# Patient Record
Sex: FEMALE | Race: WHITE | Employment: UNEMPLOYED | URBAN - METROPOLITAN AREA
[De-identification: names, ages, dates, MRNs, and addresses within clinical notes are randomized per-mention and may not be internally consistent; named-entity substitution may affect disease eponyms.]

---

## 2018-05-21 ENCOUNTER — OFFICE VISIT (OUTPATIENT)
Dept: FAMILY MEDICINE CLINIC | Age: 78
End: 2018-05-21
Payer: MEDICARE

## 2018-05-21 VITALS
OXYGEN SATURATION: 98 % | BODY MASS INDEX: 25.6 KG/M2 | HEART RATE: 128 BPM | HEIGHT: 63 IN | SYSTOLIC BLOOD PRESSURE: 138 MMHG | WEIGHT: 144.5 LBS | TEMPERATURE: 97.2 F | DIASTOLIC BLOOD PRESSURE: 98 MMHG

## 2018-05-21 DIAGNOSIS — I61.3 RIGHT-SIDED NONTRAUMATIC INTRACEREBRAL HEMORRHAGE OF BRAINSTEM (HCC): Primary | Chronic | ICD-10-CM

## 2018-05-21 DIAGNOSIS — R00.0 TACHYCARDIA: Chronic | ICD-10-CM

## 2018-05-21 DIAGNOSIS — I10 ESSENTIAL HYPERTENSION: Chronic | ICD-10-CM

## 2018-05-21 DIAGNOSIS — E78.00 PURE HYPERCHOLESTEROLEMIA: Chronic | ICD-10-CM

## 2018-05-21 DIAGNOSIS — G81.94 HEMIPARESIS OF LEFT NONDOMINANT SIDE DUE TO NONTRAUMATIC INTRACEREBRAL HEMORRHAGE (HCC): Chronic | ICD-10-CM

## 2018-05-21 DIAGNOSIS — I61.9 HEMIPARESIS OF LEFT NONDOMINANT SIDE DUE TO NONTRAUMATIC INTRACEREBRAL HEMORRHAGE (HCC): Chronic | ICD-10-CM

## 2018-05-21 DIAGNOSIS — D50.9 IRON DEFICIENCY ANEMIA, UNSPECIFIED IRON DEFICIENCY ANEMIA TYPE: Chronic | ICD-10-CM

## 2018-05-21 PROCEDURE — 4040F PNEUMOC VAC/ADMIN/RCVD: CPT | Performed by: FAMILY MEDICINE

## 2018-05-21 PROCEDURE — G8400 PT W/DXA NO RESULTS DOC: HCPCS | Performed by: FAMILY MEDICINE

## 2018-05-21 PROCEDURE — 93000 ELECTROCARDIOGRAM COMPLETE: CPT | Performed by: FAMILY MEDICINE

## 2018-05-21 PROCEDURE — 1090F PRES/ABSN URINE INCON ASSESS: CPT | Performed by: FAMILY MEDICINE

## 2018-05-21 PROCEDURE — 1036F TOBACCO NON-USER: CPT | Performed by: FAMILY MEDICINE

## 2018-05-21 PROCEDURE — 99205 OFFICE O/P NEW HI 60 MIN: CPT | Performed by: FAMILY MEDICINE

## 2018-05-21 PROCEDURE — G8419 CALC BMI OUT NRM PARAM NOF/U: HCPCS | Performed by: FAMILY MEDICINE

## 2018-05-21 PROCEDURE — G8427 DOCREV CUR MEDS BY ELIG CLIN: HCPCS | Performed by: FAMILY MEDICINE

## 2018-05-21 PROCEDURE — 1123F ACP DISCUSS/DSCN MKR DOCD: CPT | Performed by: FAMILY MEDICINE

## 2018-05-21 RX ORDER — LISINOPRIL 20 MG/1
20 TABLET ORAL DAILY
COMMUNITY
Start: 2018-05-03 | End: 2018-05-23 | Stop reason: SDUPTHER

## 2018-05-21 RX ORDER — ATENOLOL 25 MG/1
TABLET ORAL
COMMUNITY
Start: 2018-05-02 | End: 2018-05-21 | Stop reason: ALTCHOICE

## 2018-05-21 RX ORDER — WHEELCHAIR
EACH MISCELLANEOUS
Qty: 1 EACH | Refills: 0 | Status: SHIPPED | OUTPATIENT
Start: 2018-05-21

## 2018-05-21 RX ORDER — SIMVASTATIN 20 MG
TABLET ORAL
COMMUNITY
Start: 2018-05-02 | End: 2018-05-21 | Stop reason: CLARIF

## 2018-05-21 RX ORDER — ROSUVASTATIN CALCIUM 20 MG/1
20 TABLET, COATED ORAL NIGHTLY
Qty: 30 TABLET | Refills: 5 | Status: SHIPPED | OUTPATIENT
Start: 2018-05-21 | End: 2018-11-15 | Stop reason: SDUPTHER

## 2018-05-21 RX ORDER — IBUPROFEN 200 MG
CAPSULE ORAL
COMMUNITY
Start: 2001-09-11

## 2018-05-21 RX ORDER — METOPROLOL SUCCINATE 50 MG/1
50 TABLET, EXTENDED RELEASE ORAL DAILY
Qty: 30 TABLET | Refills: 5 | Status: SHIPPED | OUTPATIENT
Start: 2018-05-21 | End: 2018-11-09 | Stop reason: DRUGHIGH

## 2018-05-21 RX ORDER — HYDROCHLOROTHIAZIDE 12.5 MG/1
12.5 CAPSULE, GELATIN COATED ORAL DAILY
Qty: 30 CAPSULE | Refills: 5 | Status: SHIPPED | OUTPATIENT
Start: 2018-05-21 | End: 2018-05-21 | Stop reason: ALTCHOICE

## 2018-05-21 RX ORDER — UBIDECARENONE 100 MG
100 CAPSULE ORAL DAILY
Qty: 30 CAPSULE | Refills: 5 | Status: SHIPPED | OUTPATIENT
Start: 2018-05-21 | End: 2018-06-27

## 2018-05-21 RX ORDER — BACLOFEN 10 MG/1
10 TABLET ORAL 3 TIMES DAILY PRN
COMMUNITY
Start: 2018-05-03 | End: 2018-06-06 | Stop reason: ALTCHOICE

## 2018-05-21 ASSESSMENT — ENCOUNTER SYMPTOMS
CHEST TIGHTNESS: 0
CONSTIPATION: 0
BLOOD IN STOOL: 0
TROUBLE SWALLOWING: 1
COUGH: 0
BACK PAIN: 0
SHORTNESS OF BREATH: 0
SORE THROAT: 0
NAUSEA: 0
VOMITING: 0
DIARRHEA: 0
ABDOMINAL PAIN: 0

## 2018-05-21 ASSESSMENT — PATIENT HEALTH QUESTIONNAIRE - PHQ9
SUM OF ALL RESPONSES TO PHQ9 QUESTIONS 1 & 2: 0
SUM OF ALL RESPONSES TO PHQ QUESTIONS 1-9: 0
1. LITTLE INTEREST OR PLEASURE IN DOING THINGS: 0
2. FEELING DOWN, DEPRESSED OR HOPELESS: 0

## 2018-05-22 ENCOUNTER — TELEPHONE (OUTPATIENT)
Dept: FAMILY MEDICINE CLINIC | Age: 78
End: 2018-05-22

## 2018-05-23 ENCOUNTER — CARE COORDINATION (OUTPATIENT)
Dept: CARE COORDINATION | Age: 78
End: 2018-05-23

## 2018-05-23 DIAGNOSIS — D50.9 IRON DEFICIENCY ANEMIA, UNSPECIFIED IRON DEFICIENCY ANEMIA TYPE: Chronic | ICD-10-CM

## 2018-05-23 DIAGNOSIS — I10 ESSENTIAL HYPERTENSION: Chronic | ICD-10-CM

## 2018-05-23 LAB
ALBUMIN SERPL-MCNC: 3.9 G/DL (ref 3.9–4.9)
ALP BLD-CCNC: 47 U/L (ref 40–130)
ALT SERPL-CCNC: 10 U/L (ref 0–33)
ANION GAP SERPL CALCULATED.3IONS-SCNC: 17 MEQ/L (ref 7–13)
AST SERPL-CCNC: 14 U/L (ref 0–35)
BASOPHILS ABSOLUTE: 0 K/UL (ref 0–0.2)
BASOPHILS RELATIVE PERCENT: 0.6 %
BILIRUB SERPL-MCNC: 0.4 MG/DL (ref 0–1.2)
BUN BLDV-MCNC: 11 MG/DL (ref 8–23)
CALCIUM SERPL-MCNC: 9.9 MG/DL (ref 8.6–10.2)
CHLORIDE BLD-SCNC: 94 MEQ/L (ref 98–107)
CHOLESTEROL, FASTING: 184 MG/DL (ref 0–199)
CO2: 25 MEQ/L (ref 22–29)
CREAT SERPL-MCNC: 0.56 MG/DL (ref 0.5–0.9)
CREATININE URINE: 119.1 MG/DL
EOSINOPHILS ABSOLUTE: 0.2 K/UL (ref 0–0.7)
EOSINOPHILS RELATIVE PERCENT: 1.9 %
FERRITIN: 816.3 NG/ML (ref 13–150)
GFR AFRICAN AMERICAN: >60
GFR NON-AFRICAN AMERICAN: >60
GLOBULIN: 3.4 G/DL (ref 2.3–3.5)
GLUCOSE BLD-MCNC: 90 MG/DL (ref 74–109)
HCT VFR BLD CALC: 36.7 % (ref 37–47)
HDLC SERPL-MCNC: 55 MG/DL (ref 40–59)
HEMOGLOBIN: 12.4 G/DL (ref 12–16)
IRON SATURATION: 15 % (ref 11–46)
IRON: 39 UG/DL (ref 37–145)
LDL CHOLESTEROL CALCULATED: 111 MG/DL (ref 0–129)
LYMPHOCYTES ABSOLUTE: 1.3 K/UL (ref 1–4.8)
LYMPHOCYTES RELATIVE PERCENT: 16.1 %
MCH RBC QN AUTO: 31.1 PG (ref 27–31.3)
MCHC RBC AUTO-ENTMCNC: 33.8 % (ref 33–37)
MCV RBC AUTO: 92 FL (ref 82–100)
MICROALBUMIN UR-MCNC: <1.2 MG/DL
MICROALBUMIN/CREAT UR-RTO: NORMAL MG/G (ref 0–30)
MONOCYTES ABSOLUTE: 0.7 K/UL (ref 0.2–0.8)
MONOCYTES RELATIVE PERCENT: 9.1 %
NEUTROPHILS ABSOLUTE: 5.8 K/UL (ref 1.4–6.5)
NEUTROPHILS RELATIVE PERCENT: 72.3 %
PDW BLD-RTO: 13 % (ref 11.5–14.5)
PLATELET # BLD: 342 K/UL (ref 130–400)
POTASSIUM SERPL-SCNC: 5 MEQ/L (ref 3.5–5.1)
RBC # BLD: 3.99 M/UL (ref 4.2–5.4)
SODIUM BLD-SCNC: 136 MEQ/L (ref 132–144)
TOTAL IRON BINDING CAPACITY: 259 UG/DL (ref 178–450)
TOTAL PROTEIN: 7.3 G/DL (ref 6.4–8.1)
TRIGLYCERIDE, FASTING: 89 MG/DL (ref 0–200)
TSH SERPL DL<=0.05 MIU/L-ACNC: 1.01 UIU/ML (ref 0.27–4.2)
WBC # BLD: 8.1 K/UL (ref 4.8–10.8)

## 2018-05-24 ENCOUNTER — CARE COORDINATION (OUTPATIENT)
Dept: CARE COORDINATION | Age: 78
End: 2018-05-24

## 2018-05-24 RX ORDER — LISINOPRIL 20 MG/1
20 TABLET ORAL DAILY
Qty: 30 TABLET | Refills: 5 | Status: SHIPPED | OUTPATIENT
Start: 2018-05-24 | End: 2018-07-25 | Stop reason: SINTOL

## 2018-06-05 ENCOUNTER — CARE COORDINATION (OUTPATIENT)
Dept: CARE COORDINATION | Age: 78
End: 2018-06-05

## 2018-06-06 ENCOUNTER — OFFICE VISIT (OUTPATIENT)
Dept: FAMILY MEDICINE CLINIC | Age: 78
End: 2018-06-06
Payer: MEDICARE

## 2018-06-06 ENCOUNTER — CARE COORDINATION (OUTPATIENT)
Dept: CARE COORDINATION | Age: 78
End: 2018-06-06

## 2018-06-06 VITALS
HEIGHT: 63 IN | BODY MASS INDEX: 25.25 KG/M2 | OXYGEN SATURATION: 96 % | WEIGHT: 142.5 LBS | RESPIRATION RATE: 14 BRPM | SYSTOLIC BLOOD PRESSURE: 118 MMHG | HEART RATE: 124 BPM | DIASTOLIC BLOOD PRESSURE: 88 MMHG | TEMPERATURE: 96.5 F

## 2018-06-06 DIAGNOSIS — R00.0 TACHYCARDIA: Chronic | ICD-10-CM

## 2018-06-06 DIAGNOSIS — F32.9 REACTIVE DEPRESSION: Chronic | ICD-10-CM

## 2018-06-06 DIAGNOSIS — E78.00 PURE HYPERCHOLESTEROLEMIA: Chronic | ICD-10-CM

## 2018-06-06 DIAGNOSIS — I61.3 RIGHT-SIDED NONTRAUMATIC INTRACEREBRAL HEMORRHAGE OF BRAINSTEM (HCC): Chronic | ICD-10-CM

## 2018-06-06 DIAGNOSIS — I61.9 HEMIPARESIS OF LEFT NONDOMINANT SIDE DUE TO NONTRAUMATIC INTRACEREBRAL HEMORRHAGE (HCC): Chronic | ICD-10-CM

## 2018-06-06 DIAGNOSIS — G81.94 HEMIPARESIS OF LEFT NONDOMINANT SIDE DUE TO NONTRAUMATIC INTRACEREBRAL HEMORRHAGE (HCC): Chronic | ICD-10-CM

## 2018-06-06 DIAGNOSIS — I10 ESSENTIAL HYPERTENSION: Primary | Chronic | ICD-10-CM

## 2018-06-06 PROCEDURE — 1036F TOBACCO NON-USER: CPT | Performed by: FAMILY MEDICINE

## 2018-06-06 PROCEDURE — G8427 DOCREV CUR MEDS BY ELIG CLIN: HCPCS | Performed by: FAMILY MEDICINE

## 2018-06-06 PROCEDURE — G8419 CALC BMI OUT NRM PARAM NOF/U: HCPCS | Performed by: FAMILY MEDICINE

## 2018-06-06 PROCEDURE — 4040F PNEUMOC VAC/ADMIN/RCVD: CPT | Performed by: FAMILY MEDICINE

## 2018-06-06 PROCEDURE — G8400 PT W/DXA NO RESULTS DOC: HCPCS | Performed by: FAMILY MEDICINE

## 2018-06-06 PROCEDURE — 1123F ACP DISCUSS/DSCN MKR DOCD: CPT | Performed by: FAMILY MEDICINE

## 2018-06-06 PROCEDURE — 99214 OFFICE O/P EST MOD 30 MIN: CPT | Performed by: FAMILY MEDICINE

## 2018-06-06 PROCEDURE — 1090F PRES/ABSN URINE INCON ASSESS: CPT | Performed by: FAMILY MEDICINE

## 2018-06-06 RX ORDER — ESCITALOPRAM OXALATE 10 MG/1
10 TABLET ORAL DAILY
Qty: 30 TABLET | Refills: 5 | Status: SHIPPED | OUTPATIENT
Start: 2018-06-06 | End: 2018-11-09 | Stop reason: SDUPTHER

## 2018-06-07 ENCOUNTER — CARE COORDINATION (OUTPATIENT)
Dept: CARE COORDINATION | Age: 78
End: 2018-06-07

## 2018-06-13 ENCOUNTER — CARE COORDINATION (OUTPATIENT)
Dept: CARE COORDINATION | Age: 78
End: 2018-06-13

## 2018-06-27 ENCOUNTER — OFFICE VISIT (OUTPATIENT)
Dept: FAMILY MEDICINE CLINIC | Age: 78
End: 2018-06-27
Payer: MEDICARE

## 2018-06-27 VITALS
SYSTOLIC BLOOD PRESSURE: 110 MMHG | DIASTOLIC BLOOD PRESSURE: 68 MMHG | HEIGHT: 63 IN | OXYGEN SATURATION: 98 % | TEMPERATURE: 97.8 F | WEIGHT: 134 LBS | RESPIRATION RATE: 16 BRPM | BODY MASS INDEX: 23.74 KG/M2 | HEART RATE: 74 BPM

## 2018-06-27 DIAGNOSIS — R00.0 TACHYCARDIA: Chronic | ICD-10-CM

## 2018-06-27 DIAGNOSIS — R05.9 COUGH: ICD-10-CM

## 2018-06-27 DIAGNOSIS — Z86.718 HISTORY OF DVT OF LOWER EXTREMITY: Chronic | ICD-10-CM

## 2018-06-27 DIAGNOSIS — I61.3 RIGHT-SIDED NONTRAUMATIC INTRACEREBRAL HEMORRHAGE OF BRAINSTEM (HCC): Chronic | ICD-10-CM

## 2018-06-27 DIAGNOSIS — F32.9 REACTIVE DEPRESSION: Chronic | ICD-10-CM

## 2018-06-27 DIAGNOSIS — I10 ESSENTIAL HYPERTENSION: Primary | Chronic | ICD-10-CM

## 2018-06-27 DIAGNOSIS — Z95.828 PRESENCE OF IVC FILTER: Chronic | ICD-10-CM

## 2018-06-27 DIAGNOSIS — G81.94 HEMIPARESIS OF LEFT NONDOMINANT SIDE DUE TO NONTRAUMATIC INTRACEREBRAL HEMORRHAGE (HCC): Chronic | ICD-10-CM

## 2018-06-27 DIAGNOSIS — J34.89 RHINORRHEA: Chronic | ICD-10-CM

## 2018-06-27 DIAGNOSIS — I61.9 HEMIPARESIS OF LEFT NONDOMINANT SIDE DUE TO NONTRAUMATIC INTRACEREBRAL HEMORRHAGE (HCC): Chronic | ICD-10-CM

## 2018-06-27 PROCEDURE — 4040F PNEUMOC VAC/ADMIN/RCVD: CPT | Performed by: FAMILY MEDICINE

## 2018-06-27 PROCEDURE — G8420 CALC BMI NORM PARAMETERS: HCPCS | Performed by: FAMILY MEDICINE

## 2018-06-27 PROCEDURE — G8427 DOCREV CUR MEDS BY ELIG CLIN: HCPCS | Performed by: FAMILY MEDICINE

## 2018-06-27 PROCEDURE — 1123F ACP DISCUSS/DSCN MKR DOCD: CPT | Performed by: FAMILY MEDICINE

## 2018-06-27 PROCEDURE — 1090F PRES/ABSN URINE INCON ASSESS: CPT | Performed by: FAMILY MEDICINE

## 2018-06-27 PROCEDURE — 99214 OFFICE O/P EST MOD 30 MIN: CPT | Performed by: FAMILY MEDICINE

## 2018-06-27 PROCEDURE — 1036F TOBACCO NON-USER: CPT | Performed by: FAMILY MEDICINE

## 2018-06-27 PROCEDURE — G8400 PT W/DXA NO RESULTS DOC: HCPCS | Performed by: FAMILY MEDICINE

## 2018-06-27 RX ORDER — FEXOFENADINE HCL 180 MG/1
180 TABLET ORAL DAILY
Qty: 30 TABLET | Refills: 5 | Status: SHIPPED | OUTPATIENT
Start: 2018-06-27 | End: 2018-07-25 | Stop reason: SINTOL

## 2018-07-02 ENCOUNTER — TELEPHONE (OUTPATIENT)
Dept: FAMILY MEDICINE CLINIC | Age: 78
End: 2018-07-02

## 2018-07-20 ENCOUNTER — CARE COORDINATION (OUTPATIENT)
Dept: CARE COORDINATION | Age: 78
End: 2018-07-20

## 2018-07-23 ENCOUNTER — CARE COORDINATION (OUTPATIENT)
Dept: CARE COORDINATION | Age: 78
End: 2018-07-23

## 2018-07-23 NOTE — CARE COORDINATION
0223  Telephone call with patient who indicated that she was still in bed. She feels everything is good in managing at home. Patient stated she is still going to outpatient therapy at this time.   No new needs or concerns at time of phone call

## 2018-07-25 ENCOUNTER — OFFICE VISIT (OUTPATIENT)
Dept: FAMILY MEDICINE CLINIC | Age: 78
End: 2018-07-25
Payer: MEDICARE

## 2018-07-25 VITALS
DIASTOLIC BLOOD PRESSURE: 66 MMHG | WEIGHT: 133.6 LBS | OXYGEN SATURATION: 98 % | SYSTOLIC BLOOD PRESSURE: 108 MMHG | RESPIRATION RATE: 18 BRPM | HEART RATE: 70 BPM | BODY MASS INDEX: 23.67 KG/M2 | TEMPERATURE: 97.9 F | HEIGHT: 63 IN

## 2018-07-25 DIAGNOSIS — G81.94 HEMIPARESIS OF LEFT NONDOMINANT SIDE DUE TO NONTRAUMATIC INTRACEREBRAL HEMORRHAGE (HCC): Chronic | ICD-10-CM

## 2018-07-25 DIAGNOSIS — I61.9 HEMIPARESIS OF LEFT NONDOMINANT SIDE DUE TO NONTRAUMATIC INTRACEREBRAL HEMORRHAGE (HCC): Chronic | ICD-10-CM

## 2018-07-25 DIAGNOSIS — J34.89 RHINORRHEA: Chronic | ICD-10-CM

## 2018-07-25 DIAGNOSIS — I10 ESSENTIAL HYPERTENSION: Primary | Chronic | ICD-10-CM

## 2018-07-25 DIAGNOSIS — F32.9 REACTIVE DEPRESSION: Chronic | ICD-10-CM

## 2018-07-25 PROCEDURE — 99214 OFFICE O/P EST MOD 30 MIN: CPT | Performed by: FAMILY MEDICINE

## 2018-07-25 PROCEDURE — 1123F ACP DISCUSS/DSCN MKR DOCD: CPT | Performed by: FAMILY MEDICINE

## 2018-07-25 PROCEDURE — 1090F PRES/ABSN URINE INCON ASSESS: CPT | Performed by: FAMILY MEDICINE

## 2018-07-25 PROCEDURE — G8427 DOCREV CUR MEDS BY ELIG CLIN: HCPCS | Performed by: FAMILY MEDICINE

## 2018-07-25 PROCEDURE — 1036F TOBACCO NON-USER: CPT | Performed by: FAMILY MEDICINE

## 2018-07-25 PROCEDURE — G8420 CALC BMI NORM PARAMETERS: HCPCS | Performed by: FAMILY MEDICINE

## 2018-07-25 PROCEDURE — G8400 PT W/DXA NO RESULTS DOC: HCPCS | Performed by: FAMILY MEDICINE

## 2018-07-25 PROCEDURE — 1101F PT FALLS ASSESS-DOCD LE1/YR: CPT | Performed by: FAMILY MEDICINE

## 2018-07-25 PROCEDURE — 4040F PNEUMOC VAC/ADMIN/RCVD: CPT | Performed by: FAMILY MEDICINE

## 2018-07-25 RX ORDER — BACLOFEN 10 MG/1
10 TABLET ORAL PRN
COMMUNITY
End: 2019-02-27

## 2018-07-25 RX ORDER — LOSARTAN POTASSIUM 50 MG/1
50 TABLET ORAL DAILY
Qty: 30 TABLET | Refills: 5 | Status: SHIPPED | OUTPATIENT
Start: 2018-07-25 | End: 2019-01-21 | Stop reason: SDUPTHER

## 2018-07-25 RX ORDER — AZELASTINE 1 MG/ML
1 SPRAY, METERED NASAL 2 TIMES DAILY
Qty: 1 BOTTLE | Refills: 5 | Status: SHIPPED | OUTPATIENT
Start: 2018-07-25 | End: 2018-11-09

## 2018-07-25 ASSESSMENT — ENCOUNTER SYMPTOMS
VOMITING: 0
DIARRHEA: 0
SORE THROAT: 0
SHORTNESS OF BREATH: 0
COUGH: 1
RHINORRHEA: 1
NAUSEA: 0
CONSTIPATION: 0

## 2018-07-25 NOTE — PROGRESS NOTES
Subjective  Alexis Tang, 66 y.o. female presents today with:  Chief Complaint   Patient presents with    Follow-up     One month f/u for mood disorder. Pt states she has been doing well. Pt states she had to stop taking the allegra because it had no relief and caused her left side to become swollen. HPI  Patient is here for f/u HTN. Is compliant with meds and has no side effects from them. Avoids added salt. Tries to eat healthy. Exercises occasionally. Has no chest pain, shortness of breath, palpitations or edema. Her BP at PT has been well-controlled. Patient is being treated for depression and has been compliant with meds which do not cause side effects. Mood is improved. No suicidal ideation. Sleep is normal.    Patient continues to have clear rhinorrhea throughout the day. No sneezing. [employer] did not help. She thinks Allegra caused swelling on her left side which is the side that is affected by her stroke. She also has had a dry, tickle cough in the back of her throat since she started lisinopril. Continues to have some spasms on her left side which are relieved by baclofen she uses rarely. He continues with physical therapy and is making fair to good progress. No other questions and or concerns for today's visit      Review of Systems   Constitutional: Positive for appetite change (Appetite is improving). Negative for activity change, fatigue and unexpected weight change. HENT: Positive for rhinorrhea. Negative for dental problem, postnasal drip and sore throat. Eyes: Negative for visual disturbance. Respiratory: Positive for cough. Negative for shortness of breath. Cardiovascular: Negative for chest pain, palpitations and leg swelling. Gastrointestinal: Negative for constipation, diarrhea, nausea and vomiting. Skin: Negative for rash. Neurological: Negative for dizziness and headaches. Hematological: Negative for adenopathy.    Psychiatric/Behavioral: daily Use in each nostril as directed     Dispense:  1 Bottle     Refill:  5    losartan (COZAAR) 50 MG tablet     Sig: Take 1 tablet by mouth daily     Dispense:  30 tablet     Refill:  5     Medications Discontinued During This Encounter   Medication Reason    fexofenadine (ALLEGRA) 180 MG tablet Side effects    lisinopril (PRINIVIL;ZESTRIL) 20 MG tablet Side effects     Return in about 2 months (around 9/25/2018) for HTN, HLD, Mood Disorder, CVA. Controlled Substances Monitoring:     No flowsheet data found.     Adrian Kimble MD

## 2018-07-26 ENCOUNTER — CARE COORDINATION (OUTPATIENT)
Dept: CARE COORDINATION | Age: 78
End: 2018-07-26

## 2018-09-02 ENCOUNTER — TELEPHONE (OUTPATIENT)
Dept: FAMILY MEDICINE CLINIC | Age: 78
End: 2018-09-02

## 2018-09-02 NOTE — TELEPHONE ENCOUNTER
On call phone call, patient stating that she is urinating blood. Started earlier today, has happened multiple times. Advised patient to go to the ER to get checked out.

## 2018-09-04 ENCOUNTER — TELEPHONE (OUTPATIENT)
Dept: FAMILY MEDICINE CLINIC | Age: 78
End: 2018-09-04

## 2018-09-04 NOTE — TELEPHONE ENCOUNTER
Patients daughter in law calling wanting to schedule an apt for today for vaginal bleeding. After speaking with Brando they were advised to take her to the ER.

## 2018-10-02 ENCOUNTER — CARE COORDINATION (OUTPATIENT)
Dept: CARE COORDINATION | Age: 78
End: 2018-10-02

## 2018-11-02 ENCOUNTER — CARE COORDINATION (OUTPATIENT)
Dept: CARE COORDINATION | Age: 78
End: 2018-11-02

## 2018-11-02 NOTE — CARE COORDINATION
mouth as needed     Historical Provider, MD   azelastine (ASTELIN) 0.1 % nasal spray 1 spray by Nasal route 2 times daily Use in each nostril as directed 7/25/18   Valiant File, MD   losartan (COZAAR) 50 MG tablet Take 1 tablet by mouth daily 7/25/18   Valiant File, MD   escitalopram (LEXAPRO) 10 MG tablet Take 1 tablet by mouth daily 6/6/18   Valiant File, MD   VOLTAREN 1 % GEL Apply 2 g topically 4 times daily as needed  5/2/18   Historical Provider, MD   calcium-vitamin D (CALCIUM 500/D) 500-200 MG-UNIT per tablet qd 9/11/01   Historical Provider, MD   Multiple Vitamins-Minerals (MULTIVITAMIN ADULT PO) qd 9/11/01   Historical Provider, MD   rosuvastatin (CRESTOR) 20 MG tablet Take 1 tablet by mouth nightly 5/21/18   Valiant File, MD   metoprolol succinate (TOPROL XL) 50 MG extended release tablet Take 1 tablet by mouth daily 5/21/18   Valiant File, MD   Handicap Placard MISC by Does not apply route Good for 5 years. 5/21/18   Valiant File, MD   Misc.  Devices [facility]) MISC To use as need for ADLs 5/21/18   Rickie File, MD       Future Appointments  Date Time Provider Vic Fajardo   11/9/2018 1:00 PM Valiant File, MD [address]

## 2018-11-09 ENCOUNTER — OFFICE VISIT (OUTPATIENT)
Dept: FAMILY MEDICINE CLINIC | Age: 78
End: 2018-11-09
Payer: MEDICARE

## 2018-11-09 VITALS
WEIGHT: 131 LBS | SYSTOLIC BLOOD PRESSURE: 132 MMHG | RESPIRATION RATE: 16 BRPM | TEMPERATURE: 97.2 F | DIASTOLIC BLOOD PRESSURE: 66 MMHG | HEIGHT: 63 IN | BODY MASS INDEX: 23.21 KG/M2 | HEART RATE: 82 BPM | OXYGEN SATURATION: 98 %

## 2018-11-09 DIAGNOSIS — I61.9 HEMIPARESIS OF LEFT NONDOMINANT SIDE DUE TO NONTRAUMATIC INTRACEREBRAL HEMORRHAGE (HCC): Chronic | ICD-10-CM

## 2018-11-09 DIAGNOSIS — J34.89 RHINORRHEA: Chronic | ICD-10-CM

## 2018-11-09 DIAGNOSIS — G81.94 HEMIPARESIS OF LEFT NONDOMINANT SIDE DUE TO NONTRAUMATIC INTRACEREBRAL HEMORRHAGE (HCC): Chronic | ICD-10-CM

## 2018-11-09 DIAGNOSIS — Z23 NEED FOR INFLUENZA VACCINATION: ICD-10-CM

## 2018-11-09 DIAGNOSIS — F32.9 REACTIVE DEPRESSION: Chronic | ICD-10-CM

## 2018-11-09 DIAGNOSIS — I61.0 BASAL GANGLIA HEMORRHAGE (HCC): Primary | ICD-10-CM

## 2018-11-09 DIAGNOSIS — I10 ESSENTIAL HYPERTENSION: Chronic | ICD-10-CM

## 2018-11-09 PROBLEM — D50.9 IRON DEFICIENCY ANEMIA: Chronic | Status: RESOLVED | Noted: 2018-05-21 | Resolved: 2018-11-09

## 2018-11-09 PROCEDURE — 99214 OFFICE O/P EST MOD 30 MIN: CPT | Performed by: FAMILY MEDICINE

## 2018-11-09 PROCEDURE — 1101F PT FALLS ASSESS-DOCD LE1/YR: CPT | Performed by: FAMILY MEDICINE

## 2018-11-09 PROCEDURE — G0008 ADMIN INFLUENZA VIRUS VAC: HCPCS | Performed by: FAMILY MEDICINE

## 2018-11-09 PROCEDURE — 1090F PRES/ABSN URINE INCON ASSESS: CPT | Performed by: FAMILY MEDICINE

## 2018-11-09 PROCEDURE — 90662 IIV NO PRSV INCREASED AG IM: CPT | Performed by: FAMILY MEDICINE

## 2018-11-09 PROCEDURE — G8420 CALC BMI NORM PARAMETERS: HCPCS | Performed by: FAMILY MEDICINE

## 2018-11-09 PROCEDURE — G8427 DOCREV CUR MEDS BY ELIG CLIN: HCPCS | Performed by: FAMILY MEDICINE

## 2018-11-09 PROCEDURE — G8482 FLU IMMUNIZE ORDER/ADMIN: HCPCS | Performed by: FAMILY MEDICINE

## 2018-11-09 PROCEDURE — 4040F PNEUMOC VAC/ADMIN/RCVD: CPT | Performed by: FAMILY MEDICINE

## 2018-11-09 PROCEDURE — 1036F TOBACCO NON-USER: CPT | Performed by: FAMILY MEDICINE

## 2018-11-09 PROCEDURE — G8400 PT W/DXA NO RESULTS DOC: HCPCS | Performed by: FAMILY MEDICINE

## 2018-11-09 PROCEDURE — 1123F ACP DISCUSS/DSCN MKR DOCD: CPT | Performed by: FAMILY MEDICINE

## 2018-11-09 RX ORDER — CHOLECALCIFEROL (VITAMIN D3) 125 MCG
500 CAPSULE ORAL DAILY
COMMUNITY

## 2018-11-09 RX ORDER — METOPROLOL SUCCINATE 100 MG/1
50 TABLET, EXTENDED RELEASE ORAL DAILY
COMMUNITY

## 2018-11-09 RX ORDER — UBIDECARENONE 100 MG
100 CAPSULE ORAL DAILY
COMMUNITY

## 2018-11-09 RX ORDER — ESCITALOPRAM OXALATE 10 MG/1
10 TABLET ORAL DAILY
Qty: 30 TABLET | Refills: 5 | Status: SHIPPED | OUTPATIENT
Start: 2018-11-09

## 2018-11-09 RX ORDER — FERROUS SULFATE 325(65) MG
325 TABLET ORAL
COMMUNITY
End: 2019-04-09

## 2018-11-09 NOTE — PROGRESS NOTES
Vaccine Information Sheet, \"Influenza - Inactivated\"  given to Sravan Silva, or parent/legal guardian of  Sravan Silva and verbalized understanding. Patient responses:    Have you ever had a reaction to a flu vaccine? No  Are you able to eat eggs without adverse effects? Yes  Do you have any current illness? No  Have you ever had Guillian [place] Syndrome? No    Flu vaccine given per order. Please see immunization tab.

## 2018-11-09 NOTE — PROGRESS NOTES
Subjective  Virgil Vanegas, 66 y.o. female presents today with:  Chief Complaint   Patient presents with    Hypertension     Patient is here for her follow up with HTN. HPI    Patient is here for f/u HTN. Is compliant with meds and has no side effects from them. Avoids added salt. Tries to eat healthy. Exercises occasionally. Has no chest pain, shortness of breath, palpitations or edema. Paraplegia - Baclofen regularly at nighttime with good results. Participating well with physical therapy and progressing. He will to cook meals on her own. Patient is being treated for depression and has been compliant with meds which do not cause side effects. Mood is improved. No suicidal ideation. Sleep is normal.      No other questions and or concerns for today's visit      Review of Systems  No fevers, chills, sweats. No unintended weight loss. No abdominal pain, nausea, vomiting, diarrhea, constipation, bloody stools, black tarry stools. No rashes. No swollen glands. No seizures. No dizziness. Rhinorrhea persists. Unable to tolerate Astelin. Past Medical History:   Diagnosis Date    Iron deficiency anemia 5/21/2018    Shingles     Stroke [facility])      Past Surgical History:   Procedure Laterality Date    TUMOR REMOVAL       Social History     Social History    Marital status:      Spouse name: N/A    Number of children: N/A    Years of education: N/A     Occupational History    Not on file.      Social History Main Topics    Smoking status: Never Smoker    Smokeless tobacco: Never Used    Alcohol use 0.6 oz/week     1 Glasses of wine per week    Drug use: No    Sexual activity: No     Other Topics Concern    Not on file     Social History Narrative    No narrative on file     Family History   Problem Relation Age of Onset    Cancer Mother      Allergies   Allergen Reactions    Ace Inhibitors     Penicillins      swelling     Current Outpatient Prescriptions   Medication

## 2018-11-15 DIAGNOSIS — E78.00 PURE HYPERCHOLESTEROLEMIA: Chronic | ICD-10-CM

## 2018-11-15 NOTE — TELEPHONE ENCOUNTER
Rx request   Requested Prescriptions     Pending Prescriptions Disp Refills    rosuvastatin (CRESTOR) 20 MG tablet [Pharmacy Med Name: ROSUVASTATIN CALCIUM 20 MG TAB] 30 tablet 5     Sig: take 1 tablet by mouth every evening     LOV 11/9/2018  Next Visit Date:  Future Appointments  Date Time Provider Vic Fajardo   2/15/2019 1:00 PM Daphnie Lowery MD [address] [place] [address]

## 2018-11-16 RX ORDER — ROSUVASTATIN CALCIUM 20 MG/1
TABLET, COATED ORAL
Qty: 30 TABLET | Refills: 5 | Status: SHIPPED | OUTPATIENT
Start: 2018-11-16 | End: 2019-04-09

## 2018-12-07 ENCOUNTER — CARE COORDINATION (OUTPATIENT)
Dept: CARE COORDINATION | Age: 78
End: 2018-12-07

## 2018-12-26 ENCOUNTER — TELEPHONE (OUTPATIENT)
Dept: FAMILY MEDICINE CLINIC | Age: 78
End: 2018-12-26

## 2018-12-26 ENCOUNTER — CARE COORDINATION (OUTPATIENT)
Dept: CARE COORDINATION | Age: 78
End: 2018-12-26

## 2019-01-21 DIAGNOSIS — I10 ESSENTIAL HYPERTENSION: Chronic | ICD-10-CM

## 2019-01-21 RX ORDER — LOSARTAN POTASSIUM 50 MG/1
TABLET ORAL
Qty: 30 TABLET | Refills: 5 | Status: SHIPPED | OUTPATIENT
Start: 2019-01-21 | End: 2019-02-27

## 2019-01-23 ENCOUNTER — CARE COORDINATION (OUTPATIENT)
Dept: CARE COORDINATION | Age: 79
End: 2019-01-23

## 2019-01-23 ENCOUNTER — TELEPHONE (OUTPATIENT)
Dept: FAMILY MEDICINE CLINIC | Age: 79
End: 2019-01-23

## 2019-02-01 ENCOUNTER — TELEPHONE (OUTPATIENT)
Dept: FAMILY MEDICINE CLINIC | Age: 79
End: 2019-02-01

## 2019-02-01 DIAGNOSIS — I82.220 IVC THROMBOSIS (HCC): Primary | ICD-10-CM

## 2019-02-02 ENCOUNTER — TELEPHONE (OUTPATIENT)
Dept: FAMILY MEDICINE CLINIC | Age: 79
End: 2019-02-02

## 2019-02-27 ENCOUNTER — OFFICE VISIT (OUTPATIENT)
Dept: GERIATRIC MEDICINE | Age: 79
End: 2019-02-27
Payer: MEDICARE

## 2019-02-27 DIAGNOSIS — I95.9 HYPOTENSION, UNSPECIFIED HYPOTENSION TYPE: Primary | ICD-10-CM

## 2019-02-27 DIAGNOSIS — R19.07 GENERALIZED ABDOMINAL MASS: ICD-10-CM

## 2019-02-27 DIAGNOSIS — N28.89 RENAL MASS: ICD-10-CM

## 2019-02-27 DIAGNOSIS — I82.90 THROMBUS: ICD-10-CM

## 2019-02-27 LAB
INR BLD: 2.8
PROTIME: NORMAL SECONDS

## 2019-02-27 PROCEDURE — 1123F ACP DISCUSS/DSCN MKR DOCD: CPT | Performed by: NURSE PRACTITIONER

## 2019-02-27 PROCEDURE — G8482 FLU IMMUNIZE ORDER/ADMIN: HCPCS | Performed by: NURSE PRACTITIONER

## 2019-02-27 PROCEDURE — 1101F PT FALLS ASSESS-DOCD LE1/YR: CPT | Performed by: NURSE PRACTITIONER

## 2019-02-27 PROCEDURE — 99309 SBSQ NF CARE MODERATE MDM 30: CPT | Performed by: NURSE PRACTITIONER

## 2019-02-27 RX ORDER — SODIUM BICARBONATE 325 MG/1
650 TABLET ORAL DAILY
COMMUNITY

## 2019-02-27 RX ORDER — WARFARIN SODIUM 2.5 MG/1
2.5 TABLET ORAL
COMMUNITY

## 2019-02-27 RX ORDER — LANOLIN ALCOHOL/MO/W.PET/CERES
3 CREAM (GRAM) TOPICAL NIGHTLY
COMMUNITY

## 2019-02-27 RX ORDER — ROPINIROLE 2 MG/1
2 TABLET, FILM COATED ORAL 3 TIMES DAILY
COMMUNITY
End: 2019-04-28 | Stop reason: SDUPTHER

## 2019-02-28 ENCOUNTER — OFFICE VISIT (OUTPATIENT)
Dept: GERIATRIC MEDICINE | Age: 79
End: 2019-02-28
Payer: MEDICARE

## 2019-02-28 DIAGNOSIS — D49.9 NEOPLASM: Primary | ICD-10-CM

## 2019-02-28 DIAGNOSIS — I82.220 IVC THROMBOSIS (HCC): ICD-10-CM

## 2019-02-28 DIAGNOSIS — I10 ESSENTIAL HYPERTENSION: ICD-10-CM

## 2019-02-28 DIAGNOSIS — D63.8 ANEMIA, CHRONIC DISEASE: ICD-10-CM

## 2019-02-28 DIAGNOSIS — M15.8 OTHER OSTEOARTHRITIS INVOLVING MULTIPLE JOINTS: ICD-10-CM

## 2019-02-28 DIAGNOSIS — R53.1 WEAKNESS: ICD-10-CM

## 2019-02-28 LAB
ANION GAP SERPL CALCULATED.3IONS-SCNC: 11 MEQ/L (ref 9–15)
BUN BLDV-MCNC: 18 MG/DL (ref 8–23)
CALCIUM SERPL-MCNC: 8.1 MG/DL (ref 8.5–9.9)
CHLORIDE BLD-SCNC: 97 MEQ/L (ref 95–107)
CO2: 25 MEQ/L (ref 20–31)
CREAT SERPL-MCNC: 1.29 MG/DL (ref 0.5–0.9)
GFR AFRICAN AMERICAN: 48.3
GFR NON-AFRICAN AMERICAN: 39.9
GLUCOSE BLD-MCNC: 88 MG/DL (ref 70–99)
HCT VFR BLD CALC: 23.3 % (ref 37–47)
HEMOGLOBIN: 7.7 G/DL (ref 12–16)
MCH RBC QN AUTO: 29.3 PG (ref 27–31.3)
MCHC RBC AUTO-ENTMCNC: 32.9 % (ref 33–37)
MCV RBC AUTO: 88.9 FL (ref 82–100)
PDW BLD-RTO: 17.2 % (ref 11.5–14.5)
PLATELET # BLD: 447 K/UL (ref 130–400)
POTASSIUM SERPL-SCNC: 4.2 MEQ/L (ref 3.4–4.9)
PREALBUMIN: 9.1 MG/DL (ref 20–40)
RBC # BLD: 2.62 M/UL (ref 4.2–5.4)
SODIUM BLD-SCNC: 133 MEQ/L (ref 135–144)
WBC # BLD: 14 K/UL (ref 4.8–10.8)

## 2019-02-28 PROCEDURE — 1101F PT FALLS ASSESS-DOCD LE1/YR: CPT | Performed by: INTERNAL MEDICINE

## 2019-02-28 PROCEDURE — 1123F ACP DISCUSS/DSCN MKR DOCD: CPT | Performed by: INTERNAL MEDICINE

## 2019-02-28 PROCEDURE — G8482 FLU IMMUNIZE ORDER/ADMIN: HCPCS | Performed by: INTERNAL MEDICINE

## 2019-02-28 PROCEDURE — 99305 1ST NF CARE MODERATE MDM 35: CPT | Performed by: INTERNAL MEDICINE

## 2019-03-01 ENCOUNTER — CARE COORDINATION (OUTPATIENT)
Dept: CARE COORDINATION | Age: 79
End: 2019-03-01

## 2019-03-01 LAB — HEMOCCULT STL QL: NORMAL

## 2019-03-04 LAB
INR BLD: 4.2
PROTIME: NORMAL SECONDS

## 2019-03-05 VITALS — TEMPERATURE: 96.9 F | HEART RATE: 71 BPM | SYSTOLIC BLOOD PRESSURE: 130 MMHG | DIASTOLIC BLOOD PRESSURE: 88 MMHG

## 2019-03-05 LAB
ANION GAP SERPL CALCULATED.3IONS-SCNC: 10 MEQ/L (ref 9–15)
BUN BLDV-MCNC: 20 MG/DL (ref 8–23)
CALCIUM SERPL-MCNC: 8.3 MG/DL (ref 8.5–9.9)
CHLORIDE BLD-SCNC: 101 MEQ/L (ref 95–107)
CO2: 26 MEQ/L (ref 20–31)
CREAT SERPL-MCNC: 1.29 MG/DL (ref 0.5–0.9)
GFR AFRICAN AMERICAN: 48.3
GFR NON-AFRICAN AMERICAN: 39.9
GLUCOSE BLD-MCNC: 78 MG/DL (ref 70–99)
HCT VFR BLD CALC: 24.5 % (ref 37–47)
HEMOGLOBIN: 8.1 G/DL (ref 12–16)
INR BLD: 4.4
MCH RBC QN AUTO: 29.5 PG (ref 27–31.3)
MCHC RBC AUTO-ENTMCNC: 33.1 % (ref 33–37)
MCV RBC AUTO: 89 FL (ref 82–100)
PDW BLD-RTO: 17.4 % (ref 11.5–14.5)
PLATELET # BLD: 460 K/UL (ref 130–400)
POTASSIUM SERPL-SCNC: 4.3 MEQ/L (ref 3.4–4.9)
PROTIME: NORMAL SECONDS
RBC # BLD: 2.75 M/UL (ref 4.2–5.4)
SODIUM BLD-SCNC: 137 MEQ/L (ref 135–144)
WBC # BLD: 10.4 K/UL (ref 4.8–10.8)

## 2019-03-06 LAB
INR BLD: 4
PROTIME: NORMAL SECONDS

## 2019-03-07 ENCOUNTER — OFFICE VISIT (OUTPATIENT)
Dept: GERIATRIC MEDICINE | Age: 79
End: 2019-03-07
Payer: MEDICARE

## 2019-03-07 DIAGNOSIS — R18.8 OTHER ASCITES: ICD-10-CM

## 2019-03-07 DIAGNOSIS — Z95.828 PRESENCE OF IVC FILTER: Chronic | ICD-10-CM

## 2019-03-07 DIAGNOSIS — R53.1 WEAKNESS: Primary | ICD-10-CM

## 2019-03-07 DIAGNOSIS — I26.99 OTHER PULMONARY EMBOLISM WITHOUT ACUTE COR PULMONALE, UNSPECIFIED CHRONICITY (HCC): ICD-10-CM

## 2019-03-07 DIAGNOSIS — R60.0 BILATERAL LEG EDEMA: ICD-10-CM

## 2019-03-07 LAB
HCT VFR BLD CALC: 24.3 % (ref 37–47)
HEMOGLOBIN: 8 G/DL (ref 12–16)

## 2019-03-07 PROCEDURE — 1101F PT FALLS ASSESS-DOCD LE1/YR: CPT | Performed by: NURSE PRACTITIONER

## 2019-03-07 PROCEDURE — 99309 SBSQ NF CARE MODERATE MDM 30: CPT | Performed by: NURSE PRACTITIONER

## 2019-03-07 PROCEDURE — G8482 FLU IMMUNIZE ORDER/ADMIN: HCPCS | Performed by: NURSE PRACTITIONER

## 2019-03-07 PROCEDURE — 1123F ACP DISCUSS/DSCN MKR DOCD: CPT | Performed by: NURSE PRACTITIONER

## 2019-03-08 LAB
HEMOCCULT STL QL: ABNORMAL
INR BLD: 2.3
PROTIME: NORMAL SECONDS

## 2019-03-11 LAB
ANION GAP SERPL CALCULATED.3IONS-SCNC: 11 MEQ/L (ref 9–15)
BUN BLDV-MCNC: 21 MG/DL (ref 8–23)
CALCIUM SERPL-MCNC: 8 MG/DL (ref 8.5–9.9)
CHLORIDE BLD-SCNC: 99 MEQ/L (ref 95–107)
CO2: 23 MEQ/L (ref 20–31)
CREAT SERPL-MCNC: 1.33 MG/DL (ref 0.5–0.9)
GFR AFRICAN AMERICAN: 46.6
GFR NON-AFRICAN AMERICAN: 38.5
GLUCOSE BLD-MCNC: 86 MG/DL (ref 70–99)
HCT VFR BLD CALC: 26.1 % (ref 37–47)
HEMOGLOBIN: 8.3 G/DL (ref 12–16)
INR BLD: 1.7
MCH RBC QN AUTO: 28.5 PG (ref 27–31.3)
MCHC RBC AUTO-ENTMCNC: 31.8 % (ref 33–37)
MCV RBC AUTO: 89.5 FL (ref 82–100)
PDW BLD-RTO: 17.2 % (ref 11.5–14.5)
PLATELET # BLD: 246 K/UL (ref 130–400)
POTASSIUM SERPL-SCNC: 4.5 MEQ/L (ref 3.4–4.9)
PROTIME: NORMAL SECONDS
RBC # BLD: 2.91 M/UL (ref 4.2–5.4)
SODIUM BLD-SCNC: 133 MEQ/L (ref 135–144)
WBC # BLD: 13.9 K/UL (ref 4.8–10.8)

## 2019-03-12 ENCOUNTER — OFFICE VISIT (OUTPATIENT)
Dept: GERIATRIC MEDICINE | Age: 79
End: 2019-03-12
Payer: MEDICARE

## 2019-03-12 DIAGNOSIS — R60.0 LEG EDEMA: ICD-10-CM

## 2019-03-12 DIAGNOSIS — Z95.828 PRESENCE OF IVC FILTER: Chronic | ICD-10-CM

## 2019-03-12 DIAGNOSIS — Z86.718 HISTORY OF DVT OF LOWER EXTREMITY: Primary | Chronic | ICD-10-CM

## 2019-03-12 DIAGNOSIS — I61.0 BASAL GANGLIA HEMORRHAGE (HCC): ICD-10-CM

## 2019-03-12 LAB
BACTERIA: NEGATIVE /HPF
BILIRUBIN URINE: NEGATIVE
BLOOD, URINE: NEGATIVE
CLARITY: ABNORMAL
COLOR: ABNORMAL
CRYSTALS, UA: ABNORMAL
EPITHELIAL CELLS, UA: ABNORMAL /HPF (ref 0–5)
GLUCOSE URINE: NEGATIVE MG/DL
HYALINE CASTS: ABNORMAL /HPF (ref 0–5)
KETONES, URINE: NEGATIVE MG/DL
LEUKOCYTE ESTERASE, URINE: ABNORMAL
NITRITE, URINE: NEGATIVE
PH UA: 5.5 (ref 5–9)
PROTEIN UA: 30 MG/DL
RBC UA: ABNORMAL /HPF (ref 0–2)
RENAL EPITHELIAL, UA: ABNORMAL /HPF
SPECIFIC GRAVITY UA: 1.02 (ref 1–1.03)
UROBILINOGEN, URINE: 1 E.U./DL
WBC UA: ABNORMAL /HPF (ref 0–5)

## 2019-03-12 PROCEDURE — 1123F ACP DISCUSS/DSCN MKR DOCD: CPT | Performed by: NURSE PRACTITIONER

## 2019-03-12 PROCEDURE — G8482 FLU IMMUNIZE ORDER/ADMIN: HCPCS | Performed by: NURSE PRACTITIONER

## 2019-03-12 PROCEDURE — 1101F PT FALLS ASSESS-DOCD LE1/YR: CPT | Performed by: NURSE PRACTITIONER

## 2019-03-12 PROCEDURE — 99309 SBSQ NF CARE MODERATE MDM 30: CPT | Performed by: NURSE PRACTITIONER

## 2019-03-13 LAB — URINE CULTURE, ROUTINE: NORMAL

## 2019-03-14 LAB
HCT VFR BLD CALC: 28.8 % (ref 37–47)
HEMOGLOBIN: 9.1 G/DL (ref 12–16)
INR BLD: 1.4
MCH RBC QN AUTO: 28 PG (ref 27–31.3)
MCHC RBC AUTO-ENTMCNC: 31.4 % (ref 33–37)
MCV RBC AUTO: 89.2 FL (ref 82–100)
PDW BLD-RTO: 17.8 % (ref 11.5–14.5)
PLATELET # BLD: 248 K/UL (ref 130–400)
PROTHROMBIN TIME: 14.4 SEC (ref 9–11.5)
RBC # BLD: 3.23 M/UL (ref 4.2–5.4)
WBC # BLD: 13.3 K/UL (ref 4.8–10.8)

## 2019-03-15 LAB
HCT VFR BLD CALC: 25.3 % (ref 37–47)
HEMOGLOBIN: 8.2 G/DL (ref 12–16)

## 2019-03-19 VITALS
DIASTOLIC BLOOD PRESSURE: 62 MMHG | RESPIRATION RATE: 18 BRPM | TEMPERATURE: 98 F | HEART RATE: 86 BPM | SYSTOLIC BLOOD PRESSURE: 112 MMHG | OXYGEN SATURATION: 98 %

## 2019-03-19 LAB
INR BLD: 1.4
PROTHROMBIN TIME: 14.1 SEC (ref 9–11.5)

## 2019-03-20 VITALS
SYSTOLIC BLOOD PRESSURE: 98 MMHG | WEIGHT: 157.6 LBS | OXYGEN SATURATION: 96 % | DIASTOLIC BLOOD PRESSURE: 62 MMHG | TEMPERATURE: 97.9 F | RESPIRATION RATE: 18 BRPM | BODY MASS INDEX: 27.92 KG/M2 | HEART RATE: 67 BPM

## 2019-03-28 LAB
INR BLD: 2.5
PROTIME: NORMAL SECONDS

## 2019-03-28 RX ORDER — MOMETASONE FUROATE 1 MG/G
CREAM TOPICAL DAILY
COMMUNITY

## 2019-03-29 ENCOUNTER — OFFICE VISIT (OUTPATIENT)
Dept: GERIATRIC MEDICINE | Age: 79
End: 2019-03-29
Payer: MEDICARE

## 2019-03-29 DIAGNOSIS — R19.07 GENERALIZED ABDOMINAL MASS: Primary | ICD-10-CM

## 2019-03-29 DIAGNOSIS — I48.91 ATRIAL FIBRILLATION, UNSPECIFIED TYPE (HCC): ICD-10-CM

## 2019-03-29 DIAGNOSIS — M62.81 GENERALIZED MUSCLE WEAKNESS: ICD-10-CM

## 2019-03-29 DIAGNOSIS — R10.12 ABDOMINAL PAIN, LEFT UPPER QUADRANT: ICD-10-CM

## 2019-03-29 PROCEDURE — 99306 1ST NF CARE HIGH MDM 50: CPT | Performed by: FAMILY MEDICINE

## 2019-03-29 PROCEDURE — 1123F ACP DISCUSS/DSCN MKR DOCD: CPT | Performed by: FAMILY MEDICINE

## 2019-03-29 PROCEDURE — G8482 FLU IMMUNIZE ORDER/ADMIN: HCPCS | Performed by: FAMILY MEDICINE

## 2019-03-30 LAB
ALBUMIN SERPL-MCNC: 2 G/DL (ref 3.5–4.6)
ALP BLD-CCNC: 69 U/L (ref 40–130)
ALT SERPL-CCNC: <5 U/L (ref 0–33)
ANION GAP SERPL CALCULATED.3IONS-SCNC: 12 MEQ/L (ref 9–15)
AST SERPL-CCNC: 41 U/L (ref 0–35)
BILIRUB SERPL-MCNC: <0.2 MG/DL (ref 0.2–0.7)
BUN BLDV-MCNC: 14 MG/DL (ref 8–23)
CALCIUM SERPL-MCNC: 7.9 MG/DL (ref 8.5–9.9)
CHLORIDE BLD-SCNC: 102 MEQ/L (ref 95–107)
CO2: 22 MEQ/L (ref 20–31)
CREAT SERPL-MCNC: 0.9 MG/DL (ref 0.5–0.9)
GFR AFRICAN AMERICAN: >60
GFR NON-AFRICAN AMERICAN: >60
GLOBULIN: 3.7 G/DL (ref 2.3–3.5)
GLUCOSE BLD-MCNC: 86 MG/DL (ref 70–99)
HCT VFR BLD CALC: 25 % (ref 37–47)
HEMOGLOBIN: 8 G/DL (ref 12–16)
MCH RBC QN AUTO: 28.6 PG (ref 27–31.3)
MCHC RBC AUTO-ENTMCNC: 32.2 % (ref 33–37)
MCV RBC AUTO: 88.9 FL (ref 82–100)
PDW BLD-RTO: 17.8 % (ref 11.5–14.5)
PLATELET # BLD: 334 K/UL (ref 130–400)
POTASSIUM SERPL-SCNC: 4.1 MEQ/L (ref 3.4–4.9)
RBC # BLD: 2.81 M/UL (ref 4.2–5.4)
SODIUM BLD-SCNC: 136 MEQ/L (ref 135–144)
TOTAL PROTEIN: 5.7 G/DL (ref 6.3–8)
WBC # BLD: 12.7 K/UL (ref 4.8–10.8)

## 2019-04-01 ENCOUNTER — OFFICE VISIT (OUTPATIENT)
Dept: GERIATRIC MEDICINE | Age: 79
End: 2019-04-01
Payer: MEDICARE

## 2019-04-01 DIAGNOSIS — R11.0 NAUSEA: Primary | ICD-10-CM

## 2019-04-01 DIAGNOSIS — R19.09 ABDOMINAL MASS OF OTHER SITE: ICD-10-CM

## 2019-04-01 PROCEDURE — 99309 SBSQ NF CARE MODERATE MDM 30: CPT | Performed by: NURSE PRACTITIONER

## 2019-04-01 PROCEDURE — 1123F ACP DISCUSS/DSCN MKR DOCD: CPT | Performed by: NURSE PRACTITIONER

## 2019-04-02 LAB
INR BLD: 2.8
PROTIME: NORMAL SECONDS

## 2019-04-03 ENCOUNTER — HOSPITAL ENCOUNTER (EMERGENCY)
Age: 79
Discharge: ANOTHER HOSPITAL | End: 2019-04-04
Payer: MEDICARE

## 2019-04-03 VITALS
TEMPERATURE: 98.5 F | RESPIRATION RATE: 18 BRPM | OXYGEN SATURATION: 99 % | HEART RATE: 72 BPM | WEIGHT: 157 LBS | BODY MASS INDEX: 28.89 KG/M2 | SYSTOLIC BLOOD PRESSURE: 112 MMHG | HEIGHT: 62 IN | DIASTOLIC BLOOD PRESSURE: 71 MMHG

## 2019-04-03 DIAGNOSIS — T83.092A OBSTRUCTED NEPHROSTOMY TUBE (HCC): Primary | ICD-10-CM

## 2019-04-03 PROCEDURE — 99284 EMERGENCY DEPT VISIT MOD MDM: CPT

## 2019-04-03 ASSESSMENT — ENCOUNTER SYMPTOMS
RHINORRHEA: 0
SORE THROAT: 0
BACK PAIN: 0
DIARRHEA: 0
ABDOMINAL DISTENTION: 0
NAUSEA: 0
COUGH: 0
TROUBLE SWALLOWING: 0
WHEEZING: 0
CONSTIPATION: 0
COLOR CHANGE: 0
VOMITING: 0
ABDOMINAL PAIN: 0
CHEST TIGHTNESS: 0
SHORTNESS OF BREATH: 0

## 2019-04-04 NOTE — ED NOTES
Report called to [facility] and given to [address] [place] [address] at Rm Cannon, RN  04/04/19 0005

## 2019-04-04 NOTE — ED NOTES
Report given to [facility] and patient transport to [facility]      Leonides Trinidad RN  04/03/19 2995

## 2019-04-04 NOTE — ED NOTES
Patient was accepted to [address] [place] [address] ER awaiting [facility] transport patient aware of transport and signed EMTALA paperwork      Sanna Bernal RN  04/03/19 4585

## 2019-04-04 NOTE — ED PROVIDER NOTES
[facility] ED  eMERGENCY dEPARTMENT eNCOUnter      Pt Name: Keith Michael  MRN: [id number]  [place] 1940  Date of evaluation: 4/3/2019  Provider: Pat Reeves       Chief Complaint   Patient presents with    Other     patient from [facility] with complaints of nephrostomy tubes (bilateral) not draining. Tubes were placed at [facility] 1 week ago and requesting to go there         HISTORY OF PRESENT ILLNESS   (Location/Symptom, Timing/Onset,Context/Setting, Quality, Duration, Modifying Factors, Severity)  Note limiting factors. Keith Michael is a 66 y.o. female who presents to the emergency department for complaint of obstruction to bilateral nephrostomy tubes. Patient reports that she is a patient of the [facility] and had a nephrostomy tubes placed one week ago at their facilities. She was at [facility] for nursing care and rehab. She states that the nephrostomy tubes were draining over the past few days well and she's also been able to urinate produce urine. She states that over the past day and become slower to drain and are now clogged. She states that the nursing staff at [facility] attempted to flush and drain with no success. She reports that she was sent by [facility] to this ER for further evaluation. Patient states that she is a patient with [facility] and was requesting to be transferred to the [facility] at time of ambulance arrival but was brought to this facility instead. Patient is requesting to be transferred to [facility] from this facility. Nursing Notes were reviewed. REVIEW OF SYSTEMS    (2-9 systems for level 4, 10 or more for level 5)     Review of Systems   Constitutional: Negative for activity change, appetite change, chills, diaphoresis, fatigue and fever. HENT: Negative for congestion, ear pain, rhinorrhea, sore throat and trouble swallowing.     Eyes: Negative for visual disturbance. Respiratory: Negative for cough, chest tightness, shortness of breath and wheezing. Cardiovascular: Negative for chest pain and palpitations. Gastrointestinal: Negative for abdominal distention, abdominal pain, constipation, diarrhea, nausea and vomiting. Genitourinary: Negative for difficulty urinating, dysuria, flank pain, hematuria and urgency. Bilateral nephrostomy tubes are clogged not draining   Musculoskeletal: Negative for back pain and myalgias. Skin: Negative for color change and rash. Neurological: Negative for dizziness, tremors, seizures, syncope, speech difficulty, weakness, light-headedness, numbness and headaches. Except as noted above the remainder of the review of systems was reviewed and negative. PAST MEDICAL HISTORY     Past Medical History:   Diagnosis Date    Depression     Hyperlipidemia     Hypertension     Iron deficiency anemia 5/21/2018    Shingles     Stroke [facility])      Past Surgical History:   Procedure Laterality Date    NEPHROSTOMY  2019    bilateral    TUMOR REMOVAL       Social History     Socioeconomic History    Marital status:      Spouse name: None    Number of children: None    Years of education: None    Highest education level: None   Occupational History    None   Social Needs    Financial resource strain: None    Food insecurity:     Worry: None     Inability: None    Transportation needs:     Medical: None     Non-medical: None   Tobacco Use    Smoking status: Never Smoker    Smokeless tobacco: Never Used   Substance and Sexual Activity    Alcohol use:  Yes     Alcohol/week: 0.6 oz     Types: 1 Glasses of wine per week    Drug use: No    Sexual activity: Never   Lifestyle    Physical activity:     Days per week: None     Minutes per session: None    Stress: None   Relationships    Social connections:     Talks on phone: None     Gets together: None     Attends Anglican service: None     Active member of club or organization: None     Attends meetings of clubs or organizations: None     Relationship status: None    Intimate partner violence:     Fear of current or ex partner: None     Emotionally abused: None     Physically abused: None     Forced sexual activity: None   Other Topics Concern    None   Social History Narrative    None       SCREENINGS      @FLOW([id number])@      PHYSICAL EXAM    (up to 7 for level 4, 8 or more for level 5)     ED Triage Vitals [04/03/19 2125]   BP Temp Temp src Pulse Resp SpO2 Height Weight   110/65 98.5 °F (36.9 °C) -- 79 20 99 % 5' 2\" (1.575 m) 157 lb (71.2 kg)       Physical Exam   Constitutional: She is oriented to person, place, and time. She appears well-developed and well-nourished. No distress. HENT:   Head: Normocephalic and atraumatic. Right Ear: External ear normal.   Left Ear: External ear normal.   Nose: Nose normal.   Mouth/Throat: Oropharynx is clear and moist. No oropharyngeal exudate. Eyes: Conjunctivae are normal. Right eye exhibits no discharge. Left eye exhibits no discharge. Cardiovascular: Normal rate, regular rhythm and intact distal pulses. Pulmonary/Chest: Effort normal.   Abdominal: Soft. She exhibits no distension. There is no tenderness. Neurological: She is alert and oriented to person, place, and time. Skin: Skin is warm and dry. Capillary refill takes less than 2 seconds. She is not diaphoretic.        DIAGNOSTIC RESULTS     EKG: All EKG's are interpreted by the Emergency Department Physician who either signs or Co-signsthis chart in the absence of a cardiologist.        RADIOLOGY:   Non-plain filmimages such as CT, Ultrasound and MRI are read by the radiologist. Plain radiographic images are visualized and preliminarily interpreted by the emergency physician with the below findings:        Interpretation per the Radiologist below, if available at the time ofthis note:    No orders to display         ED BEDSIDE mis-transcribed.)    NEHA Melton CNP (electronically signed)  Attending Emergency Physician         NEHA Melton CNP  04/03/19 6257

## 2019-04-04 NOTE — ED NOTES
Bed: 05  Expected date:   Expected time:   Means of arrival:   Comments:  Pt from [place]- issues with nephrostomy tubes     Jesus Valadez RN  04/03/19 2128

## 2019-04-04 NOTE — ED NOTES
Elmer Carrera PA at bedside talking with patient. Patient repeated her concerns of \"wanting to be sent to [address] due to her Surgeon and all of her Dr's being at the [facility] \"  Patient stated I told [facility] and [facility] this. I don't want anything done here I just want to be transferred to [address]. This Nurse was going to start an IV and get lab work started but patient \"refused IV and refused to have lab work drawn. Again stated \"I want to be transferred to [address] [facility]"  Patient nephrostomy tubes have a lot of sediments in them with very minimal drainage in them. Patient is alert and oriented x4, no fever, denies pain, no SOB, no chest pain.   Patient does have bilateral LE swelling      Gaetano Car, MIGUEL  04/03/19 7873

## 2019-04-05 ENCOUNTER — CARE COORDINATION (OUTPATIENT)
Dept: CARE COORDINATION | Age: 79
End: 2019-04-05

## 2019-04-05 NOTE — CARE COORDINATION
Per chart review, patient currently inpatient status at [facility]. On 4/5/2019 patient seen in ER at [facility]. Patient requested and was transferred to [facility] ER. Patient appears to be currently admitted at [address] [place] [address].

## 2019-04-09 ENCOUNTER — OFFICE VISIT (OUTPATIENT)
Dept: GERIATRIC MEDICINE | Age: 79
End: 2019-04-09
Payer: MEDICARE

## 2019-04-09 ENCOUNTER — OFFICE VISIT (OUTPATIENT)
Dept: GERIATRIC MEDICINE | Age: 79
End: 2019-04-09

## 2019-04-09 DIAGNOSIS — I10 ESSENTIAL HYPERTENSION: ICD-10-CM

## 2019-04-09 DIAGNOSIS — I63.9 CEREBROVASCULAR ACCIDENT (CVA), UNSPECIFIED MECHANISM (HCC): ICD-10-CM

## 2019-04-09 DIAGNOSIS — R19.00 ABDOMINAL MASS, UNSPECIFIED ABDOMINAL LOCATION: Primary | ICD-10-CM

## 2019-04-09 DIAGNOSIS — R53.1 WEAKNESS: ICD-10-CM

## 2019-04-09 DIAGNOSIS — Z86.718 HISTORY OF DVT OF LOWER EXTREMITY: Chronic | ICD-10-CM

## 2019-04-09 DIAGNOSIS — N13.5 BILATERAL URETERAL OBSTRUCTION: ICD-10-CM

## 2019-04-09 DIAGNOSIS — R19.00 PELVIC MASS: Primary | ICD-10-CM

## 2019-04-09 DIAGNOSIS — M15.9 OSTEOARTHRITIS OF MULTIPLE JOINTS, UNSPECIFIED OSTEOARTHRITIS TYPE: ICD-10-CM

## 2019-04-09 PROCEDURE — 1123F ACP DISCUSS/DSCN MKR DOCD: CPT | Performed by: NURSE PRACTITIONER

## 2019-04-09 PROCEDURE — 1123F ACP DISCUSS/DSCN MKR DOCD: CPT | Performed by: INTERNAL MEDICINE

## 2019-04-09 PROCEDURE — 99305 1ST NF CARE MODERATE MDM 35: CPT | Performed by: INTERNAL MEDICINE

## 2019-04-09 PROCEDURE — 99310 SBSQ NF CARE HIGH MDM 45: CPT | Performed by: NURSE PRACTITIONER

## 2019-04-09 RX ORDER — SIMETHICONE 80 MG
80 TABLET,CHEWABLE ORAL EVERY 8 HOURS PRN
COMMUNITY

## 2019-04-10 VITALS
WEIGHT: 171 LBS | DIASTOLIC BLOOD PRESSURE: 60 MMHG | HEART RATE: 90 BPM | SYSTOLIC BLOOD PRESSURE: 108 MMHG | HEIGHT: 62 IN | TEMPERATURE: 98.2 F | BODY MASS INDEX: 31.47 KG/M2 | RESPIRATION RATE: 18 BRPM

## 2019-04-10 NOTE — PROGRESS NOTES
PATIENTMavra Dent : 1940 DOS: 2019     [facility]      CHIEF COMPLAINT: Left upper quadrant abdominal pain. HISTORY OF PRESENT ILLNESS: This is a 66-year-old female with medical history very significant for basal ganglion intracranial hemorrhage (2018), right lower extremity DVT s/p IVCF (2018), hypertension, PMB s/p hysteroscopic myomectomy/D&C with benign pathology, hyperlipidemia and tremors, who was admitted to [facility] on 2019 purposely for bridging from warfarin to heparin in the setting of prior IV hemorrhage and active venous thromboembolism. However, she was observed to have a 20-cm abdominal mass, which Pathology initially revealed (as of 2018 and 2019) has disordered proliferative endometrium with some fragments suggestive of benign endometrial polyp. A CT scan of the abdomen and pelvis that was obtained on 2019 demonstrated a 12.3 cm x 16.7 cm mass possibly arising from the uterus with small-to-moderate volume perihepatic and perisplenic ascites. Meanwhile, an MRI of 03/15/2019 demonstrated large lobulated soft tissue pelvic mass surrounding the uterus and extending to the left sided abdomen measuring approximately 16.4 cm x 20.3 cm x 25.6 cm, with an increase in the size since her CT of 2019. She also had moderate bilateral hydronephrosis with the left greater than the right and moderate volume ascites. Before she was discharged from the hospital, after several tests were performed and consultations were obtained, she had 2 nephrostomy tubes placed and she is expected to [facility] 11 days from now. Today she endorses pain in her left upper quadrant stroke flank region, which makes it a little bit uncomfortable for her, but not to the point of disturbing her sleep or her respiration.  She denies any associated nausea, vomiting, diarrhea, constipation, dysuria or gross hematuria. No associated fever or any antecedent history of trauma to the area or any falls at all in the last 1 year. She indeed has a history of CVA with left-sided hemiparesis, worse on the left upper extremity, which she is unable to move at all unlike the left lower extremity that she is able to move around. She ambulates at home without the use of any assistive device. PAST MEDICAL HISTORY: Hypertension, hyperlipidemia, basal ganglion intracranial hemorrhage with resultant left hemiparesis (04/2018), depression, hysteroscopy, tooth extraction, tumor removal from the back, and history of DVT status post IVC filter placement. FAMILY HISTORY: Significant for heart disease in her father. SOCIAL HISTORY: The patient is  and lives at home with her  and has grown son also lives in the same compound. She denies using tobacco products, but drinks wine socially. No history of illicit drug use. ALLERGIES: Penicillins cause swelling. MEDICATIONS: CoQ10 100 mg capsule once daily; Coumadin 2.5 mg tablet once daily in the evening for AFib; Dulcolax 10 mg suppository insert 1 unit q. daily for constipation p.r.n.; Elocon cream 0.1% apply to the left ear topically every day for rash; Lexapro 10 mg tablet once daily; Fleet enema insert 1 suppository rectally q.24 hours for constipation; ferrous sulfate 325 mg tablet 1 tablet q. daily; ketoconazole cream 2% apply to the affected area twice daily; melatonin tablet 3 mg tablet 1 by mouth q.24 hours for insomnia; Toprol- mg tablet give 50 mg tablet by mouth once daily; Milk of Magnesia give 30 mL by mouth q. daily for constipation; multivitamin tablet 1 tablet by mouth once daily; oyster shell with vitamin D 500/200 mg-unit 1 tablet by mouth once daily; Requip 2 mg tablet 3 times daily for restless legs; rosuvastatin 20 mg tablet once daily in the morning; sodium bicarbonate 650 mg tablet once daily in the morning;  Tylenol 325 mg tablet 2 tablets by mouth q.4 hours p.r.n. for pain; vitamin B12 500 mg tablet in the morning; Voltaren Gel 1% apply 2 g transdermally every 6 hours as needed for pain. REVIEW OF SYSTEMS: Aside from pain in her left flank/left upper abdominal region, and generalized weakness in addition to leg swelling, she denies having headaches, dizziness, lightheadedness, cough, chest pain, shortness of breath, nausea, vomiting, diarrhea, constipation, depression, anxiety, delusion, hallucinations, gross hematuria. However, she has an abdominal swelling and her appetite remains the same. She denies having hematemesis. PHYSICAL EXAMINATION: Her vital signs include blood pressure 108/60 mmHg, heart rate is 90 per minute, respiratory rate is 18 per minute, temperature is 98.2, and she is saturating at 98% on room air. She is 62 inches tall and weighs 171 pounds. The patient is a warm and pleasant, but thin and chronically ill-appearing, elderly,  female, who is alert and lying supine in bed. Head is normocephalic and atraumatic with pupils equal, round, and reactive to light and accommodation. She has moist oral mucosa and neck is supple without palpable thyromegaly. She exhibits normal respiratory excursions with good air entry bilaterally and audible heart tones, S1 and S2, with irregularly irregular rhythm. However, her abdomen is distended with very firm mass and vague tenderness in the left flank region. She has 2 nephrostomy tubes draining clear urine bilaterally. In fact, she has 4+ pitting pedal edema bilaterally up to the thighs and skin is warm and dry. She is alert and oriented x4 with significant weakness in the left lower extremity and obvious paralysis of the left upper extremity. Muscle strength is 0/5 in the left upper extremity and approximately 3/5 in the left lower extremity, and 4/5 in both upper and right lower extremities.  At the moment, she uses an assistive device (manual wheelchair) for ambulation and despite all of these, she has appropriate mood and affect. ASSESSMENT AND PLAN:  1. Abdominal/pelvic mass: She has already had several studies obtained and so far the mass is said to be a benign lesion, but she has an appointment already scheduled for repeat test to be performed at the [facility] 10 to 11 days from now. So for now, we will continue to monitor her and obtain daily abdominal girth measurements. 2.  Left upper quadrant abdominal pain: Due to #1 above and we will treat the underlying medical condition and also provide her with adequate pain management. 3.  Atrial fibrillation: She is on Coumadin 2.5 mg daily and we will obtain a PT/INR in the morning and treat accordingly. She was counseled regarding falls and possible hemorrhage including intracranially. She is to request for assistance at all times and we will also place a mat or skit protector on the floor. 4.  Generalized muscle weakness: PT/OT/ST to evaluate and treat accordingly. 5.  Pedal edema: This could be due to both the effect of the thrombosis and also might be as a result of the effects of the abdominal or pelvic mass preventing adequate venous return to the heart. So for now, we will elevate the foot of the bed and also give her some diuretics.   6.  Advanced directives: Full code.      Bj Sanchez MD  Attending Geriatrician        Electronically Signed By: Sandra Rhoades MD on 04/08/2019 21:21:42  ______________________________  Sandra Rhoades MD  /[id number]  D: 03/29/2019 18:45:19  T: 03/30/2019 18:52:20    cc: - Ksenia Healy

## 2019-04-11 VITALS — HEART RATE: 76 BPM | DIASTOLIC BLOOD PRESSURE: 78 MMHG | SYSTOLIC BLOOD PRESSURE: 114 MMHG

## 2019-04-11 LAB
INR BLD: 3.5
PROTIME: NORMAL SECONDS

## 2019-04-11 NOTE — PROGRESS NOTES
PATIENTHorris Congress : 1940 DOS: 2019     [facility]    Admission history and physical.    CHIEF COMPLAINT: Questionable anuria. HISTORY OF PRESENT ILLNESS: Subjectively, this is a very pleasant 80-year-old woman who has a known history of abdominal mass, has had bilateral nephrostomy tubes in place. She has been noted to have decreasing output from the nephrostomy tubes. They attempted to adjust that in-house; however, she failed that and was subsequently sent to the hospital for evaluation. The patient was not truly anuric as she continued to have urine from the bladder. The patient is known to have recent intracranial hemorrhage with stroke with resultant left-sided weakness. The patient has been following with GYN Oncology. The patient has had DVT of the bilateral lower extremities, having been anticoagulated for that. The patient did undergo urinalysis while in hospital at this time. Renal function is monitored. The patient did have recent element of renal impairment. Patient's INR was obtained, result is  bleeding. The patient was seen by nephrology. Her functional status was improved. The patient did make gains globally. The patient was subsequently transferred back here for ongoing course of care. At this time, her left nephrostomy tube is working well. The right one has minimal output. PAST MEDICAL HISTORY: Include abdominopelvic mass, atrial fibrillation with anticoagulation, DVT, weakness, status post CVA with left-sided weakness, uncontrolled hypertension, constipation. MEDICATIONS: On arrival included the following. She is on B12 1000mcg daily, Lexapro 10 mg daily, metoprolol succinate  mg half tablet twice daily, simethicone 80 mg daily, Requip 2 mg 3 times daily, Voltaren gel, Coumadin 2.5 mg daily. FAMILY HISTORY: Negative for early onset neoplasm    SOCIAL HISTORY: The patient is up in the room, pleasant, interactive. Nonsmoker, nondrinker.  Globally

## 2019-04-12 LAB
INR BLD: 3.5
PROTIME: NORMAL SECONDS

## 2019-04-15 LAB
INR BLD: 1.8
PROTIME: NORMAL SECONDS

## 2019-04-18 VITALS
DIASTOLIC BLOOD PRESSURE: 71 MMHG | OXYGEN SATURATION: 97 % | HEART RATE: 60 BPM | SYSTOLIC BLOOD PRESSURE: 92 MMHG | RESPIRATION RATE: 18 BRPM | TEMPERATURE: 97.7 F

## 2019-04-18 VITALS
TEMPERATURE: 97.9 F | DIASTOLIC BLOOD PRESSURE: 62 MMHG | HEART RATE: 77 BPM | SYSTOLIC BLOOD PRESSURE: 110 MMHG | OXYGEN SATURATION: 98 % | RESPIRATION RATE: 18 BRPM

## 2019-04-18 PROBLEM — R19.00 ABDOMINAL LUMP: Status: ACTIVE | Noted: 2019-04-18

## 2019-04-18 LAB
INR BLD: 1.5
PROTIME: NORMAL SECONDS

## 2019-04-18 NOTE — PROGRESS NOTES
PATIENTLedian Ngo : 1940 DOS: 2019     [facility]    The patient is being seen for acute visit for nephrostomy tube blockage. The patient just returned last night from a hospitalization. She has abdominal tumor with hydronephrosis. She has bilateral nephrostomy tubes and they had stopped draining. The nurses have orders to flush the tubes. The patient and the nursing report from the hospital did state that one of the tubes is flushing and one of the tubes is not. It is not draining urine and both of the tubes were leaking when they were being flushed. She has a history of a stroke with left-sided residual, hypertension, bilateral lower extremity edema and DVTs. She has an IVC filter placement in the past and she had a history of a partial tumor removal. The good news is the patient is having urine output via the bladder even if the tubes do not drain well. Per the hospital the right tube they were not able to flush and the nurse did state she is not able to flush it and when she went to flush the left tube, it was leaking. She is on Coumadin 2.5 mg daily. Her last INR on the  was 3.2, so we will have to watch her closely. She is not having any issues. She says she feels stronger when she comes back to rehab, so she is excited about being here. Her  was present for the visit. She makes no complaints. There is no nausea any longer. She does not have any vomiting. She is having good bowel movement. No chest pain. No shortness of breath. Lower extremity still she says are quite heavy because of the swelling. She is not having any back pain, kidney pain. No fevers, chills or sweats. MEDICATIONS AND ALLERGIES: Reviewed in the nursing facility chart.     MEDICATION AND ALLERGIES: REVIEWED IN THE NF CHART    FAMILY MEDICAL &  SOCIAL HISTORY: SEE EPIC H & P    Past Medical History:   Diagnosis Date    Depression     Hyperlipidemia     Hypertension     Iron deficiency anemia 5/21/2018    Shingles     Stroke ([facility] [address]          PHYSICAL EXAMINATION: She is 92/71, 60 heart rate, 18 respirations, temp 36.5 and O2 saturations 97% room air. She is resting in bed. She is comfortable. She is awake and alert, oriented x3, pleasant, conversant. EOMs intact. Speech is clear and fluent. HEART: Regular, rate. LUNGS: Clear to auscultation. ABDOMEN: Distended with probable ascites, slightly firm. LOWER EXTREMITIES: 3+ edema bilaterally thighs down to toes and sacral edema noted. Nephrostomy tube dressings are dry and intact. The bags are empty. There is no urine in either nephrostomy bag. ASSESSMENT AND PLAN:   1.  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 The patient is due to see her nephrologist on 04/22 for followup and plan on what they are going to do regarding the pelvic tumor. 2.  CVA. PT, OT, rehabilitation here. 3.  DVT. She is on Coumadin. We will keep her INR between 2 and 3 and watch for any bleeding dyscrasias. She is on iron p.r.n. She does have a history of anemia, so we will restart her iron. She can take that as needed. She is due for lab work today, iron panel, BMP, CBC and we will follow up with INR with adjustment of her Coumadin.           Electronically Signed By: LISS Govea GNP-BC on 04/16/2019 23:10:30  ______________________________  LISS Govea GNP-BC  /[id number]  D: 04/09/2019 12:13:48  T: 04/10/2019 08:17:03    cc: Froy Moreno

## 2019-04-19 PROBLEM — N13.5 BILATERAL URETERAL OBSTRUCTION: Status: ACTIVE | Noted: 2019-04-19

## 2019-04-19 PROBLEM — I63.9 CEREBROVASCULAR ACCIDENT (CVA) (HCC): Status: ACTIVE | Noted: 2019-04-19

## 2019-04-19 PROBLEM — R19.00 PELVIC MASS: Status: ACTIVE | Noted: 2019-04-19

## 2019-04-22 LAB
INR BLD: 1.8
PROTIME: NORMAL SECONDS

## 2019-04-24 ENCOUNTER — TELEPHONE (OUTPATIENT)
Dept: FAMILY MEDICINE CLINIC | Age: 79
End: 2019-04-24

## 2019-04-24 NOTE — TELEPHONE ENCOUNTER
Sukhdev Faustin from [facility] [phone] would like an order for skilled nursing and Physical therapy, as PT is being discharged from nursing facility. She would like a verbal order for this and an INR draw on 4/29/19. Please advise, thank you.

## 2019-04-28 ENCOUNTER — TELEPHONE (OUTPATIENT)
Dept: INTERNAL MEDICINE CLINIC | Age: 79
End: 2019-04-28

## 2019-04-28 RX ORDER — ROPINIROLE 2 MG/1
2 TABLET, FILM COATED ORAL 3 TIMES DAILY
Qty: 90 TABLET | Refills: 0 | Status: SHIPPED | OUTPATIENT
Start: 2019-04-28

## 2019-04-29 ENCOUNTER — TELEPHONE (OUTPATIENT)
Dept: FAMILY MEDICINE CLINIC | Age: 79
End: 2019-04-29

## 2019-04-29 ENCOUNTER — CARE COORDINATION (OUTPATIENT)
Dept: CARE COORDINATION | Age: 79
End: 2019-04-29

## 2019-04-29 RX ORDER — WARFARIN SODIUM 3 MG/1
TABLET ORAL
COMMUNITY

## 2019-04-29 NOTE — CARE COORDINATION
Ambulatory Care Coordination Note  4/29/2019  CM Risk Score: 4  Aye Mortality Risk Score: 15    ACC: Freya Whitaker RN    Summary Note:  Patient inpatient admissions: 1/23-2/26 [facility] Dx hypotension, neoplasm; 3/19-3/27 [facility] Dx: Pelvic mass; 4/4-4/8 [facility] obstructive nephrostomy tube. [facility]: 2/26- 3/19; 3/27-4/4, and 4/8 - 4/26. Received call from patient's family member, Valerie Dye. Patient speaking in background. Informed patient discharged home from SNF on 4/26/2019. She was calling to verify follow-up appointment with PCP. Rescheduled patient's appointment from provider to [facility] Osvaldo Patiño MD. Spoke with patient. Patient reports appetite fair. Patient remains weak. Patient receiving home health services. Discussed what to expect regarding HHC. Reconciled home medications. Patient reports no drainage from nephrostomy tube. Patient reports, \"peeing like a race hoarse\". Patient denies pain. Reviewed S/S to report to physician. Care Coordination Interventions    Program Enrollment:  Rising Risk  Referral from Primary Care Provider:  Yes  Suggested Interventions and [employer] on [employer]:  Declined  Physical Therapy:   (Comment: Outpatient PT at [place])  Social Work:  In Process (Comment: [address])  Other Services or Interventions:  12/26/2018 Instructed on availability of Same Day/Next Day appointments and [address] [place]. Goals Addressed                 This Visit's Progress     Medication Management   On track     I will take my medication as directed. I will notify my provider of any problems with medications, like adverse effects or side effects. I will notify my provider for advice before I stop taking any of my medication.     Barriers: lack of education  Plan for overcoming my barriers: N/A  Confidence: 10/10  Anticipated Goal Completion Date: 12/30/2018 Update: 5/30/2019 4/6/2019 Update: Goals deferred as patient currently inpatient status. Plan is to resume CC services when patient discharged home. Update 4/29/2019 resumed CC.  Self Monitoring   On track     Blood Pressure - I will take my blood pressure as directed - Daily  I will notify my provider of any trends of increasing or decreasing blood pressures over a month period of time. I will notify my provider of any changes in blood pressure associated with symptoms of dizziness, falls, passing out, headache, confusion/change in mental status. None Recently Recorded    Barriers: lack of education  Plan for overcoming my barriers: Care Coordination  Confidence: 9/10  Anticipated Goal Completion Date: 12/30/2018 Update: 6/30/2019 4/6/2019 Update: Goals deferred as patient currently inpatient status. Plan is to resume CC services when patient discharged home. Update 4/29/2019 Resumed CC services. Prior to Admission medications    Medication Sig Start Date End Date Taking? Authorizing Provider   warfarin (COUMADIN) 3 MG tablet Take by mouth four times a week Indications: Monday, Wednesday, Thursday, and Saturdays alternating with 2.5 other days of the week. Yes Historical Provider, MD   rOPINIRole (REQUIP) 2 MG tablet Take 1 tablet by mouth 3 times daily 4/28/19  Yes Coralie Castleman, APRN - CNP   mometasone (ELOCON) 0.1 % cream Apply topically daily Apply topically daily.    Yes Historical Provider, MD   warfarin (COUMADIN) 2.5 MG tablet Take 2.5 mg by mouth three times a week Indications: Tuesday, Friday, and Sunday    Yes Historical Provider, MD   sodium bicarbonate 325 MG tablet Take 650 mg by mouth daily   Yes Historical Provider, MD   melatonin 3 MG TABS tablet Take 3 mg by mouth nightly   Yes Historical Provider, MD   metoprolol succinate (TOPROL XL) 100 MG extended release tablet Take 50 mg by mouth daily    Yes Historical Provider, MD   coenzyme Q10 100 MG CAPS capsule Take 100 mg by mouth daily   Yes Historical Provider, MD   vitamin B-12 (CYANOCOBALAMIN) 500 MCG tablet Take 500 mcg by mouth daily   Yes Historical Provider, MD   escitalopram (LEXAPRO) 10 MG tablet Take 1 tablet by mouth daily 11/9/18  Yes Keenan Vann MD   VOLTAREN 1 % GEL Apply 2 g topically 4 times daily as needed  5/2/18  Yes Historical Provider, MD   calcium-vitamin D (CALCIUM 500/D) 500-200 MG-UNIT per tablet DAILY 9/11/01  Yes Historical Provider, MD   Multiple Vitamins-Minerals (MULTIVITAMIN ADULT PO) qd 9/11/01  Yes Historical Provider, MD   Handicap Placard MISC by Does not apply route Good for 5 years. 5/21/18  Yes Keenan Vann MD   Misc.  Devices [facility]) MISC To use as need for ADLs 5/21/18  Yes Keenan Vann MD   [facility]) 80 MG chewable tablet Take 80 mg by mouth every 8 hours as needed for Flatulence    Historical Provider, MD       Future Appointments   Date Time Provider Vic Fajardo   5/7/2019  1:30 PM Keenan Vann MD [address] [place] [address]

## 2019-04-29 NOTE — TELEPHONE ENCOUNTER
Monica from [facility] [phone]    Would like a New rx for Warfarin 2mg    Patients Bilateral tubes that are getting flushed every other day with normal saline with a dressing change, Monica wants to know if this can be reduces to 3days a week due to there availability

## 2019-04-30 ENCOUNTER — CARE COORDINATION (OUTPATIENT)
Dept: CARE COORDINATION | Age: 79
End: 2019-04-30

## 2019-04-30 NOTE — CARE COORDINATION
Received referral from [address] [place] [address], requesting to contact patient. Patient may need some community resources and long term planning. Called patient and discussed concerns or needs. Inquired about medication affordability, transportation, housekeeping, cooking and shopping. Patient explained that she has no major concerns. Patient reports that she has all medications and has no issues with affording them. Patient reports that she has trnasportation but reports that due to the mass on her leg it is difficult to get in and out of the car. Patient reports that she had looked into other options of getting around but her insurance will not cover it and she does not want to pay out of pocket. Patient reports that she is okay Luis Landers  taking her to appointments at this time. Patient reports that she has support from her spouse, son and his girlfriend with cooking and shopping. Patient also reports that she has assistance with housekeeping once a month that she pays out of pocket. Patient informed CHW that she has home care that will be coming later today to start therapy. Patient reports no needs at this time. CHW spoke with [address] [place] [address] to update about patient. [address] RN reports that she received a call from patients relative Todd Beyer, stating that patient needed more assistance.   CHW will contact Julio Middleton  for more information

## 2019-04-30 NOTE — CARE COORDINATION
100 MG extended release tablet Take 50 mg by mouth daily     Historical Provider, MD   coenzyme Q10 100 MG CAPS capsule Take 100 mg by mouth daily    Historical Provider, MD   vitamin B-12 (CYANOCOBALAMIN) 500 MCG tablet Take 500 mcg by mouth daily    Historical Provider, MD   escitalopram (LEXAPRO) 10 MG tablet Take 1 tablet by mouth daily 11/9/18   Cindi Sibley MD   VOLTAREN 1 % GEL Apply 2 g topically 4 times daily as needed  5/2/18   Historical Provider, MD   calcium-vitamin D (CALCIUM 500/D) 500-200 MG-UNIT per tablet DAILY 9/11/01   Historical Provider, MD   Multiple Vitamins-Minerals (MULTIVITAMIN ADULT PO) qd 9/11/01   Historical Provider, MD   Handicap Placard MISC by Does not apply route Good for 5 years. 5/21/18   Cindi Sibley MD   Misc.  Devices [facility]) MISC To use as need for ADLs 5/21/18   Cindi Sibley MD       Future Appointments   Date Time Provider Vic Fajardo   5/7/2019  1:30 PM Cindi Sibley MD [address] [place] [address]

## 2019-05-01 ENCOUNTER — CARE COORDINATION (OUTPATIENT)
Dept: CARE COORDINATION | Age: 79
End: 2019-05-01

## 2019-05-01 NOTE — CARE COORDINATION
CHW received information that patient relative Emmy called with concerns about patient needing more care. Placed call to [address] and left message to discuss what she feels that patient needs assistance with.

## 2019-05-02 ENCOUNTER — TELEPHONE (OUTPATIENT)
Dept: FAMILY MEDICINE CLINIC | Age: 79
End: 2019-05-02

## 2019-05-02 ENCOUNTER — TELEPHONE (OUTPATIENT)
Dept: PHARMACY | Facility: CLINIC | Age: 79
End: 2019-05-02

## 2019-05-02 NOTE — TELEPHONE ENCOUNTER
Spoke with Renee Oliver at [facility] who states patient was admitted 05/01/19 to the hospital due to tubes not working properly. She states she will contact coumadin clinic when pt is discharged to receive correct orders. Thank you.

## 2019-05-02 NOTE — TELEPHONE ENCOUNTER
Received a referral:  from Care Coordinator to review patients medications. Called patient to schedule a time to speak with a pharmacist over the telephone. No answer left VM: Please contact us at [phone] Option #7 to schedule this appointment. Pharmacists are available Monday thru Friday 7:30 AM till 5:30 PM.    Elaina Hogan CP.   [facility]  Department, toll free: [phone], option 7

## 2019-05-02 NOTE — TELEPHONE ENCOUNTER
Brendon, Physical Therapist at [address] [facility] called to notify you that the patient is currently at [facility], they did not get to work with the patient due to a malfunction to her tube.

## 2019-05-31 ENCOUNTER — CARE COORDINATION (OUTPATIENT)
Dept: CARE COORDINATION | Age: 79
End: 2019-05-31

## 2019-05-31 NOTE — CARE COORDINATION
Chart reviewed. Patient  on 2019 at [facility]. Patient discharged from Care Coordination.

## 2022-06-14 NOTE — CARE COORDINATION
Recorded    Barriers: lack of education  Plan for overcoming my barriers: N/A  Confidence: 9/10  Anticipated Goal Completion Date: 12/30/2018              Prior to Admission medications    Medication Sig Start Date End Date Taking? Authorizing Provider   rosuvastatin (CRESTOR) 20 MG tablet take 1 tablet by mouth every evening 11/16/18   Abigail Chang MD   metoprolol succinate (TOPROL XL) 100 MG extended release tablet Take 100 mg by mouth daily    Historical Provider, MD   coenzyme Q10 100 MG CAPS capsule Take 100 mg by mouth daily    Historical Provider, MD   vitamin B-12 (CYANOCOBALAMIN) 500 MCG tablet Take 500 mcg by mouth daily    Historical Provider, MD   ferrous sulfate 325 (65 Fe) MG tablet Take 325 mg by mouth daily (with breakfast)    Historical Provider, MD   escitalopram (LEXAPRO) 10 MG tablet Take 1 tablet by mouth daily 11/9/18   Abigail Chang MD   baclofen (LIORESAL) 10 MG tablet Take 10 mg by mouth as needed     Historical Provider, MD   losartan (COZAAR) 50 MG tablet Take 1 tablet by mouth daily 7/25/18   Abigail Chang MD   VOLTAREN 1 % GEL Apply 2 g topically 4 times daily as needed  5/2/18   Historical Provider, MD   calcium-vitamin D (CALCIUM 500/D) 500-200 MG-UNIT per tablet qd 9/11/01   Historical Provider, MD   Multiple Vitamins-Minerals (MULTIVITAMIN ADULT PO) qd 9/11/01   Historical Provider, MD   Handicap Placard MISC by Does not apply route Good for 5 years. 5/21/18   Abigail Chang MD   Misc.  Devices [facility]) MISC To use as need for ADLs 5/21/18   Abigail Chang MD       Future Appointments  Date Time Provider Vic Fajardo   2/15/2019 1:00 PM MD [address]
Attending Attestation (For Attendings USE Only)...

## 2025-04-26 NOTE — TELEPHONE ENCOUNTER
May 17, 2019May 2019 Encounter Summary, generated on May 18, 2019May 2019      No further outreach. Consult PT/OT   Fall precautions